# Patient Record
Sex: MALE | Race: WHITE | Employment: FULL TIME | URBAN - METROPOLITAN AREA
[De-identification: names, ages, dates, MRNs, and addresses within clinical notes are randomized per-mention and may not be internally consistent; named-entity substitution may affect disease eponyms.]

---

## 2017-02-23 DIAGNOSIS — I10 ESSENTIAL HYPERTENSION: ICD-10-CM

## 2017-02-23 RX ORDER — AMLODIPINE BESYLATE 10 MG/1
TABLET ORAL
Qty: 90 TAB | Refills: 3 | Status: SHIPPED | OUTPATIENT
Start: 2017-02-23

## 2017-04-24 DIAGNOSIS — E11.9 TYPE 2 DIABETES MELLITUS WITHOUT COMPLICATION, UNSPECIFIED LONG TERM INSULIN USE STATUS: Primary | ICD-10-CM

## 2018-08-29 ENCOUNTER — TELEPHONE (OUTPATIENT)
Dept: FAMILY MEDICINE CLINIC | Age: 53
End: 2018-08-29

## 2018-08-29 NOTE — TELEPHONE ENCOUNTER
[phone] message  [phone], spoke with patient    He has a new provider closer to his home. Patient came into the office yesterday requesting a copy of the sleep study he had done back in 2002. He was advised an order would be put in to retrieve chart from storage. Called patient phone number and message was left with Mrs. Bernice Kenyon to have the patient return my call. He called today and was informed chart was received and sleep study information date was given with the name of business. Patient called back to say they are out of business. Now asking for a copy of this study as this is needed per his new insurance plan. His new doctor has written a script for a new c-pap machine but they will not pay for it until they receive a copy of the study. Sarwat Bravo he has had this c-pap machine for a very long time and he really needs a new one. He was advised this concern would be sent to management for review.

## 2018-08-30 NOTE — TELEPHONE ENCOUNTER
Medical release rec'd of [facility] for sleep study report. Faxed with confirmation. Called and left voice mail at both numbers in this encounter.